# Patient Record
Sex: FEMALE | Race: WHITE | NOT HISPANIC OR LATINO | Employment: FULL TIME | URBAN - METROPOLITAN AREA
[De-identification: names, ages, dates, MRNs, and addresses within clinical notes are randomized per-mention and may not be internally consistent; named-entity substitution may affect disease eponyms.]

---

## 2023-09-18 ENCOUNTER — OFFICE VISIT (OUTPATIENT)
Dept: DERMATOLOGY | Facility: CLINIC | Age: 33
End: 2023-09-18
Payer: COMMERCIAL

## 2023-09-18 DIAGNOSIS — R22.9 SKIN NODULE: Primary | ICD-10-CM

## 2023-09-18 PROCEDURE — 99203 OFFICE O/P NEW LOW 30 MIN: CPT | Performed by: PHYSICIAN ASSISTANT

## 2023-09-18 RX ORDER — FLUTICASONE PROPIONATE 50 MCG
SPRAY, SUSPENSION (ML) NASAL
COMMUNITY
Start: 2021-08-08

## 2023-09-18 ASSESSMENT — PAIN SCALES - GENERAL: PAINLEVEL: NO PAIN (0)

## 2023-09-18 NOTE — NURSING NOTE
Dermatology Rooming Note    Laisha Zapata's goals for this visit include:   Chief Complaint   Patient presents with    Derm Problem     Lumps on the right wrist.  Noticed first one 3 years ago, couple more have appeared and gotten bigger.       Polly Solis, CMA

## 2023-09-18 NOTE — PROGRESS NOTES
[facility] Dermatology Note  Encounter Date: Sep 18, 2023  Office Visit     Dermatology Problem List:  1. Skin nodule,likely ganglion cyst  Ordered US    ____________________________________________    Assessment & Plan:     # Skin nodule, likely Ganglion cyst of the right volar wrist.  We will order ultrasound for confirmation and determine management.  Discussed this is typically excised orthopedic surgery if it becomes bothersome/painful or interferes with daily activities of living      Procedures Performed:   None      Follow-up: prn for new or changing lesions    Staff:     All risk, benefits and alternatives were discussed with patient.  Patient is in agreement and understands the assessment and plan.  All questions were answered.  Sun Screen Education was given.   Return to Clinic as needed.   Cassie Huang PA-C    ____________________________________________    CC: Derm Problem (Lumps on the right wrist.  Noticed first one 3 years ago, couple more have appeared and gotten bigger.  )    HPI:  Ms. Laisha Zapata is a(n) 33 year old female who presents today as a new patient for a growth on her wrist. She is right hand dominant.  She noticed a lump on her right wrist approximately 3 years ago and is gradually gotten slightly larger.  It is nonpainful and does not bother her when she is typing.    She wonders what this growth is and if there is anything she needs to do about it.    No personal or family history of skin cancer but her cousin has eczema.      Patient is otherwise feeling well, without additional skin concerns.     Labs Reviewed:  N/A    Physical Exam:  Vitals: There were no vitals taken for this visit.  SKIN: Focused examination of the right wrist was performed.  -  2 cm non tender subcutaneous nodule on the right volar wrist, radially   - No other lesions of concern on areas examined.             Medications:  Current Outpatient Medications   Medication    fluticasone  (FLONASE) 50 MCG/ACT nasal spray     No current facility-administered medications for this visit.      Past Medical History:   There is no problem list on file for this patient.    History reviewed. No pertinent past medical history.    CC Referred Self, MD  No address on file on close of this encounter.

## 2023-09-18 NOTE — LETTER
9/18/2023       RE: Laisah Zapata  [address]  [place] [address]     Dear Colleague,    Thank you for referring your patient, Laisha Zapata, to the [facility] [place] at [facility]. Please see a copy of my visit note below.    [facility] Dermatology Note  Encounter Date: Sep 18, 2023  Office Visit     Dermatology Problem List:  1. Skin nodule,likely ganglion cyst  Ordered US    ____________________________________________    Assessment & Plan:     # Skin nodule, likely Ganglion cyst of the right volar wrist.  We will order ultrasound for confirmation and determine management.  Discussed this is typically excised orthopedic surgery if it becomes bothersome/painful or interferes with daily activities of living      Procedures Performed:   None      Follow-up: prn for new or changing lesions    Staff:     All risk, benefits and alternatives were discussed with patient.  Patient is in agreement and understands the assessment and plan.  All questions were answered.  Sun Screen Education was given.   Return to Clinic as needed.   Cassie Huang PA-C    ____________________________________________    CC: Derm Problem (Lumps on the right wrist.  Noticed first one 3 years ago, couple more have appeared and gotten bigger.  )    HPI:  Ms. Laisha Zapata is a(n) 33 year old female who presents today as a new patient for a growth on her wrist. She is right hand dominant.  She noticed a lump on her right wrist approximately 3 years ago and is gradually gotten slightly larger.  It is nonpainful and does not bother her when she is typing.    She wonders what this growth is and if there is anything she needs to do about it.    No personal or family history of skin cancer but her cousin has eczema.      Patient is otherwise feeling well, without additional skin concerns.     Labs Reviewed:  N/A    Physical Exam:  Vitals:  There were no vitals taken for this visit.  SKIN: Focused examination of the right wrist was performed.  -  2 cm non tender subcutaneous nodule on the right volar wrist, radially   - No other lesions of concern on areas examined.             Medications:  Current Outpatient Medications   Medication    fluticasone (FLONASE) 50 MCG/ACT nasal spray     No current facility-administered medications for this visit.      Past Medical History:   There is no problem list on file for this patient.    History reviewed. No pertinent past medical history.    CC Referred Self, MD  No address on file on close of this encounter.

## 2023-09-26 ENCOUNTER — ANCILLARY PROCEDURE (OUTPATIENT)
Dept: ULTRASOUND IMAGING | Facility: CLINIC | Age: 33
End: 2023-09-26
Attending: PHYSICIAN ASSISTANT
Payer: COMMERCIAL

## 2023-09-26 DIAGNOSIS — R22.9 SKIN NODULE: ICD-10-CM

## 2023-09-26 PROCEDURE — 76882 US LMTD JT/FCL EVL NVASC XTR: CPT | Mod: RT | Performed by: RADIOLOGY

## 2023-10-22 ENCOUNTER — HEALTH MAINTENANCE LETTER (OUTPATIENT)
Age: 33
End: 2023-10-22

## 2024-12-15 ENCOUNTER — HEALTH MAINTENANCE LETTER (OUTPATIENT)
Age: 34
End: 2024-12-15